# Patient Record
Sex: MALE | Race: WHITE | NOT HISPANIC OR LATINO
[De-identification: names, ages, dates, MRNs, and addresses within clinical notes are randomized per-mention and may not be internally consistent; named-entity substitution may affect disease eponyms.]

---

## 2023-01-05 PROBLEM — Z00.00 ENCOUNTER FOR PREVENTIVE HEALTH EXAMINATION: Status: ACTIVE | Noted: 2023-01-05

## 2023-01-07 ENCOUNTER — APPOINTMENT (OUTPATIENT)
Dept: ORTHOPEDIC SURGERY | Facility: CLINIC | Age: 53
End: 2023-01-07
Payer: COMMERCIAL

## 2023-01-07 VITALS — DIASTOLIC BLOOD PRESSURE: 98 MMHG | SYSTOLIC BLOOD PRESSURE: 144 MMHG | RESPIRATION RATE: 14 BRPM | HEART RATE: 94 BPM

## 2023-01-07 PROCEDURE — 73030 X-RAY EXAM OF SHOULDER: CPT | Mod: 50

## 2023-01-07 PROCEDURE — 99203 OFFICE O/P NEW LOW 30 MIN: CPT

## 2023-01-07 NOTE — HISTORY OF PRESENT ILLNESS
[de-identified] : Patient presents today for evaluation of bilateral anterior shoulder pain.  He has had the pain for approximately 2 to 3 months now.  It started with the left shoulder.  He states it progressed to the right.  The right shoulder is now worse than the left.  He most notes this complaint when he is at the gym doing shoulder and overhead presses.  He reports of a popping sensation.  He states the pain initially started at night when sleeping.  He states there is a dull ache within the front of the shoulder area.  He states occasionally feels like the shoulder is slipping.  He reports of loss of strength of the shoulders with physical activity.  He has not had any formal dislocations.  He states he does not have pain when typing or other sedentary type gentle activities.  He does not have pain during the course of the day when at work as a .  He denies of any neck pain.  No radicular symptoms are reported.  He is right-hand dominant.\par \par Review of Systems-\par Constitutional: No fever or chills. \par Cardiovascular: No orthopnea or chest pain\par Pulmonary: No shortness of breath. \par GI: No nausea or vomiting or abdominal pain.\par Musculoskeletal: see HPI \par Psychiatric: No anxiety and depression.

## 2023-01-07 NOTE — PHYSICAL EXAM
[de-identified] : The patient is conversive and in no apparent distress. The patient is alert and oriented to person, place, and time. Affect and mood appear normal. The head is normocephalic and atraumatic. Skin shows normal turgor with no evidence of eczema or psoriasis. No respiratory distress noted. Sensation grossly intact. MUSCULOSKELETAL:   SEE BELOW\par \par Bilateral shoulder exam demonstrates symmetry of both shoulders.  Normal range of motion of the cervical spine.  No trapezial tenderness.  Forward flexion of 170 degrees is noted.  No significant stiffness.  No pain provocation.  External rotation of 40 degrees.  No pain is noted.  Internal rotation to the upper lumbar spine.  No pain provocation.  There is pain of both shoulders, right greater than left, [place]'s testing.  No pain against resisted rotator cuff motion.  No weakness is found.  Normal sensation to light touch. [de-identified] : Bilateral shoulder complete x-rays were reviewed.  Moderate degenerative changes of the right AC joint.  Minimal degenerative changes of the left AC joint.  Joint spaces otherwise preserved.  No fractures.  No lytic lesions.  No calcifications.

## 2023-01-07 NOTE — DISCUSSION/SUMMARY
[de-identified] : 30 minutes was spent reviewing the x-rays as well as discussing with the patient their clinical presentation, diagnosis and providing education.  Given the patient's presentation of complaints and physical exam findings, the patient is a high probability for having labral tears of both shoulders.  At this time he is recommended MRIs of both shoulders.  He is to reduce upper extremity strength exercises to reduce discomfort.  He will complete the MRIs and return back to office for reevaluation.  All questions were answered to the patient satisfaction.

## 2023-01-21 ENCOUNTER — APPOINTMENT (OUTPATIENT)
Dept: MRI IMAGING | Facility: CLINIC | Age: 53
End: 2023-01-21

## 2023-02-05 ENCOUNTER — OUTPATIENT (OUTPATIENT)
Dept: OUTPATIENT SERVICES | Facility: HOSPITAL | Age: 53
LOS: 1 days | End: 2023-02-05
Payer: COMMERCIAL

## 2023-02-05 ENCOUNTER — APPOINTMENT (OUTPATIENT)
Dept: MRI IMAGING | Facility: CLINIC | Age: 53
End: 2023-02-05
Payer: COMMERCIAL

## 2023-02-05 DIAGNOSIS — M25.511 PAIN IN RIGHT SHOULDER: ICD-10-CM

## 2023-02-05 PROCEDURE — 73221 MRI JOINT UPR EXTREM W/O DYE: CPT

## 2023-02-05 PROCEDURE — 73221 MRI JOINT UPR EXTREM W/O DYE: CPT | Mod: 26,RT

## 2023-02-24 ENCOUNTER — APPOINTMENT (OUTPATIENT)
Dept: ORTHOPEDIC SURGERY | Facility: CLINIC | Age: 53
End: 2023-02-24
Payer: COMMERCIAL

## 2023-02-24 VITALS
HEART RATE: 83 BPM | SYSTOLIC BLOOD PRESSURE: 138 MMHG | HEIGHT: 66 IN | DIASTOLIC BLOOD PRESSURE: 75 MMHG | BODY MASS INDEX: 27.32 KG/M2 | WEIGHT: 170 LBS

## 2023-02-24 DIAGNOSIS — Z78.9 OTHER SPECIFIED HEALTH STATUS: ICD-10-CM

## 2023-02-24 DIAGNOSIS — M25.512 PAIN IN RIGHT SHOULDER: ICD-10-CM

## 2023-02-24 DIAGNOSIS — M25.511 PAIN IN RIGHT SHOULDER: ICD-10-CM

## 2023-02-24 PROCEDURE — 99214 OFFICE O/P EST MOD 30 MIN: CPT

## 2023-02-24 RX ORDER — MELOXICAM 15 MG/1
15 TABLET ORAL
Qty: 21 | Refills: 0 | Status: ACTIVE | COMMUNITY
Start: 2023-02-24 | End: 1900-01-01

## 2023-02-24 NOTE — REVIEW OF SYSTEMS
[Joint Pain] : joint pain [Negative] : Heme/Lymph [FreeTextEntry9] : bilateral shoulder pain, right worse than left

## 2023-02-24 NOTE — DISCUSSION/SUMMARY
[de-identified] : Assessment: 52-year-old male with bilateral shoulder pain, right worse than left, secondary to rotator cuff and biceps tendinopathy\par \par Plan: I had a long discussion with the patient today regarding the nature of their diagnosis and treatment plan. We discussed the risks and benefits of no treatment as well as nonoperative and operative treatments.  I reviewed the patient's x-rays and MRIs today with him in the office.  The patient has mild arthritic changes in the shoulder but primarily tendinopathy of the rotator cuff and biceps tendon.  On examination he has good range of motion and strength.  Most the patient's pain is localized to the biceps groove of the right shoulder.  At this time I recommend conservative treatment of the patient's condition with modalities including rest, ice, heat, anti-inflammatory medications, activity modifications, and home stretching and strengthening exercises daily.  A prescription for meloxicam was sent to the patient's pharmacy today.  I discussed with the patient the risks and benefits associated with NSAID use.  A referral for physical therapy was provided to begin working on exercises for his shoulders to help improve his pain and function.  Recommend follow-up in 8 weeks for repeat assessment.  If the patient continues have pain at the time we will consider a cortisone injection.\par \par The patient verbalizes their understanding and agrees with the plan.  All questions were answered to their satisfaction.
No

## 2023-02-24 NOTE — PHYSICAL EXAM
[de-identified] : General:\par Awake, alert, no acute distress, Patient was cooperative and appropriate during the examination.\par \par The patient is of normal weight for height and age.\par \par Walks without an antalgic gait.\par \par Full, painless range of motion of the neck and back.\par \par Exam of the bilateral lower extremities is intact and symmetric with regards to dermatologic, vascular, and neurologic exam. Bilateral lower extremity sensation is grossly intact to light touch in the DP/SP/T/S/S nerve distributions. Intact DF/PF/EHL. BIlateral lower extremities warm and well-perfused with brisk capillary refill.\par \par \par Pulmonary:\par Regular, nonlabored breathing\par \par Abdomen:\par Soft, nontender, nondistended.\par \par Lymphatic:\par No evidence of axillary lymphadenopathy\par \par Bilateral Shoulder Exam:\par Physical exam of the shoulders demonstrates normal skin without signs of skin changes or abnormalities. No erythema, warmth, or joint effusion appreciated. \par \par Sensation intact light touch C5-T1 bilaterally\par Palpable radial pulses\par Radial/ulnar/median/axillary/musculocutaneous/AIN/PIN nerves grossly intact\par \par Range of motion:\par Forward Flexion: 175 bilaterally\par Abduction: 130 bilaterally\par External Rotation: 30 bilaterally\par Internal Rotation: L1 bilaterally\par \par Palpation:\par Mildly tender to palpation over the glenohumeral joints\par Not tender palpation over the rotator cuff insertion on the greater tuberosities\par Not tender to palpation over the AC joints\par Exquisitely tender to palpation about the right shoulder of the biceps groove, no tenderness on the left\par \par Strength testing:\par Supraspinatus: 5/5\par Infraspinatus: 5/5\par Subscapularis: 5/5\par \par Special test:\par Empty can test positive bilaterally, worse on the right\par Tam impingement test positive bilaterally, worse on the right\par Speeds test positive on the right, negative on the left\par [place]'s test negative bilaterally\par Lift-off test negative bilaterally\par Bell-press test negative bilaterally\par Cross-arm adduction test negative bilaterally [de-identified] : MRI of the right shoulder from a [facility] imaging facility on 2/5/2023 was reviewed today.  The patient has moderate to severe supraspinatus and mild infraspinatus tendinosis.  Moderate to high-grade articular surface rotator cuff tearing extending from the mild insertion of the supraspinatus tendon to the anterior insertional fibers of the infraspinatus tendon.  Intrasubstance tearing along the cranial insertional fibers of the subscapularis tendon in the setting of mild tendinosis.  Mild long head of biceps tenosynovitis.  Moderate tendinosis of the intra-articular portion of the long head of the biceps tendon.  Partial-thickness nondisplaced tearing along the superior to posterior glenoid labrum in the setting of labral degeneration.  Small joint effusion with synovitis.  Mild subacromial and subdeltoid bursitis.  Superficial chondral wear along the superomedial aspect of the humerus.\par \par MRI of the left shoulder from a [facility] imaging facility on 2/5/2022 was reviewed today.  Findings suggestive of adhesive capsulitis.  Shallow articular surface fraying along the anterior to midportion of the supraspinatus tendon without full-thickness or retracted tear.  Intrasubstance tear along the anterior insertional fibers of the infraspinatus tendon with adjacent enthesopathic change.  Small pineal joint effusion with synovitis.  Mild tendinosis of the intra-articular portion of the long head of biceps tendon.  Focal intrasubstance tearing involving the proximal portion of long head of the biceps tendon.  Chondral wear along the anterior margin of the glenoid.  Degeneration of the glenoid labrum with partial-thickness nondisplaced tearing along the anterior superior to posterior superior labrum.  Fraying along the chondral labral junction of the anterior inferior labrum.\par \par X-rays including 4 views of the bilateral shoulders from previous office visit were reviewed today.  There is no acute fracture or dislocation.  There are mild degenerative changes of the inferior joints.  There are moderate AC joint with Jerry changes in the right shoulder, mild arthritic changes of the left shoulder and AC joint.

## 2023-02-24 NOTE — HISTORY OF PRESENT ILLNESS
[de-identified] : 2/24/2023: Dino is a pleasant 52-year-old right-hand-dominant male  who presents to the office today complaining of bilateral shoulder pain which is worse on his right side.  The patient states that his pain began over the summer weightlifting.  He has noticed pain that is gotten worse with overhead movement such as shoulder pressing and  pressing.  The pain has gotten progressively worse over the past several months.  He recently saw RAHEEL Galo who ordered bilateral MRIs of the shoulders.  He comes in to discuss those results and further treatment options today.  The patient denies any fevers, chills, sweats, recent illnesses, numbness, tingling, or pain elsewhere at this time.

## 2024-08-19 ENCOUNTER — APPOINTMENT (OUTPATIENT)
Dept: GASTROENTEROLOGY | Facility: CLINIC | Age: 54
End: 2024-08-19
Payer: COMMERCIAL

## 2024-08-19 VITALS
SYSTOLIC BLOOD PRESSURE: 138 MMHG | DIASTOLIC BLOOD PRESSURE: 80 MMHG | TEMPERATURE: 98 F | HEART RATE: 77 BPM | WEIGHT: 170 LBS | RESPIRATION RATE: 14 BRPM | OXYGEN SATURATION: 95 % | BODY MASS INDEX: 27.32 KG/M2 | HEIGHT: 66 IN

## 2024-08-19 DIAGNOSIS — F17.200 NICOTINE DEPENDENCE, UNSPECIFIED, UNCOMPLICATED: ICD-10-CM

## 2024-08-19 DIAGNOSIS — Z12.11 ENCOUNTER FOR SCREENING FOR MALIGNANT NEOPLASM OF COLON: ICD-10-CM

## 2024-08-19 PROCEDURE — 99203 OFFICE O/P NEW LOW 30 MIN: CPT

## 2024-08-19 RX ORDER — POLYETHYLENE GLYCOL 3350 AND ELECTROLYTES WITH LEMON FLAVOR 236; 22.74; 6.74; 5.86; 2.97 G/4L; G/4L; G/4L; G/4L; G/4L
236 POWDER, FOR SOLUTION ORAL
Qty: 1 | Refills: 0 | Status: ACTIVE | COMMUNITY
Start: 2024-08-19 | End: 1900-01-01

## 2024-08-19 NOTE — PHYSICAL EXAM
[Alert] : alert [Healthy Appearing] : healthy appearing [No Acute Distress] : no acute distress [Normal] : normal bowel sounds, non-tender, no masses, soft, no no hepato-splenomegaly [Abdomen Tenderness] : non-tender [No Masses] : no abdominal mass palpated [Abdomen Soft] : soft [Abnormal Walk] : normal gait [Normal Color / Pigmentation] : normal skin color and pigmentation [Motor Exam] : the motor exam was normal [Oriented To Time, Place, And Person] : oriented to person, place, and time [Normal Affect] : the affect was normal [Normal Mood] : the mood was normal

## 2024-08-19 NOTE — ASSESSMENT
[FreeTextEntry1] : Colon cancer screening Average Prior colonoscopy: never had the test Alarm symptoms: none Plan: Patient scheduled for colonoscopy Preparation (golytely) sent and explained to patient Patient not on any anti-thrombotics

## 2024-09-24 ENCOUNTER — NON-APPOINTMENT (OUTPATIENT)
Age: 54
End: 2024-09-24

## 2024-09-24 DIAGNOSIS — F17.200 NICOTINE DEPENDENCE, UNSPECIFIED, UNCOMPLICATED: ICD-10-CM

## 2024-09-30 ENCOUNTER — RESULT REVIEW (OUTPATIENT)
Age: 54
End: 2024-09-30

## 2024-09-30 ENCOUNTER — OUTPATIENT (OUTPATIENT)
Dept: OUTPATIENT SERVICES | Facility: HOSPITAL | Age: 54
LOS: 1 days | End: 2024-09-30
Payer: COMMERCIAL

## 2024-09-30 ENCOUNTER — APPOINTMENT (OUTPATIENT)
Dept: GASTROENTEROLOGY | Facility: HOSPITAL | Age: 54
End: 2024-09-30

## 2024-09-30 DIAGNOSIS — Z12.11 ENCOUNTER FOR SCREENING FOR MALIGNANT NEOPLASM OF COLON: ICD-10-CM

## 2024-09-30 PROCEDURE — 45390 COLONOSCOPY W/RESECTION: CPT

## 2024-09-30 PROCEDURE — 88305 TISSUE EXAM BY PATHOLOGIST: CPT | Mod: 26

## 2024-09-30 PROCEDURE — 45390 COLONOSCOPY W/RESECTION: CPT | Mod: PT

## 2024-09-30 PROCEDURE — C1889: CPT

## 2024-09-30 PROCEDURE — 88305 TISSUE EXAM BY PATHOLOGIST: CPT

## 2024-09-30 DEVICE — CLIP MANTIS 235CMX2.8MM: Type: IMPLANTABLE DEVICE | Status: FUNCTIONAL

## 2024-09-30 DEVICE — CLIP RESOLUTION 360 235CM: Type: IMPLANTABLE DEVICE | Status: FUNCTIONAL

## 2024-09-30 RX ORDER — SODIUM CHLORIDE 0.9 % (FLUSH) 0.9 %
500 SYRINGE (ML) INJECTION
Refills: 0 | Status: COMPLETED | OUTPATIENT
Start: 2024-09-30 | End: 2024-09-30

## 2024-09-30 RX ADMIN — Medication 75 MILLILITER(S): at 09:30

## 2024-10-01 LAB — SURGICAL PATHOLOGY STUDY: SIGNIFICANT CHANGE UP

## (undated) DEVICE — CONTAINER FORMALIN BUFF 10% 60ML

## (undated) DEVICE — Device

## (undated) DEVICE — FORCEP RADIAL JAW 4 240CM DISP

## (undated) DEVICE — PLATE NESSY 170

## (undated) DEVICE — SNARE EXACTO COLD 9MMX230CM

## (undated) DEVICE — ENDOCUFF VISION SZ 2 LG GRN

## (undated) DEVICE — KIT ENDO PROCEDURE CUST W/VLV

## (undated) DEVICE — SNARE ELECTROSURG OVAL 2.8MM X 230CM X 20MM

## (undated) DEVICE — SNARE POLYP SENS SM 13MM 240CM

## (undated) DEVICE — TUBING CAP SET ERBEFLO CLEVERCAP HYBRID CO2 FOR OLYMPUS SCOPES AND UCR

## (undated) DEVICE — SOL IRR POUR H2O 1000ML

## (undated) DEVICE — POLY TRAP ETRAP